# Patient Record
Sex: MALE | Race: WHITE
[De-identification: names, ages, dates, MRNs, and addresses within clinical notes are randomized per-mention and may not be internally consistent; named-entity substitution may affect disease eponyms.]

---

## 2019-11-08 ENCOUNTER — HEALTH MAINTENANCE LETTER (OUTPATIENT)
Age: 34
End: 2019-11-08

## 2020-12-06 ENCOUNTER — HEALTH MAINTENANCE LETTER (OUTPATIENT)
Age: 35
End: 2020-12-06

## 2021-09-25 ENCOUNTER — HEALTH MAINTENANCE LETTER (OUTPATIENT)
Age: 36
End: 2021-09-25

## 2022-01-15 ENCOUNTER — HEALTH MAINTENANCE LETTER (OUTPATIENT)
Age: 37
End: 2022-01-15

## 2023-01-07 ENCOUNTER — HEALTH MAINTENANCE LETTER (OUTPATIENT)
Age: 38
End: 2023-01-07

## 2024-02-10 ENCOUNTER — HEALTH MAINTENANCE LETTER (OUTPATIENT)
Age: 39
End: 2024-02-10

## 2024-12-29 ENCOUNTER — HOSPITAL ENCOUNTER (EMERGENCY)
Facility: CLINIC | Age: 39
Discharge: HOME | End: 2024-12-29
Attending: EMERGENCY MEDICINE | Admitting: EMERGENCY MEDICINE
Payer: COMMERCIAL

## 2024-12-29 VITALS
WEIGHT: 155 LBS | TEMPERATURE: 98 F | BODY MASS INDEX: 22.19 KG/M2 | HEIGHT: 70 IN | OXYGEN SATURATION: 97 % | HEART RATE: 58 BPM | SYSTOLIC BLOOD PRESSURE: 109 MMHG | RESPIRATION RATE: 18 BRPM | DIASTOLIC BLOOD PRESSURE: 74 MMHG

## 2024-12-29 DIAGNOSIS — S61.411A LACERATION OF RIGHT HAND WITHOUT FOREIGN BODY, INITIAL ENCOUNTER: ICD-10-CM

## 2024-12-29 PROCEDURE — 99282 EMERGENCY DEPT VISIT SF MDM: CPT | Mod: 25

## 2024-12-29 PROCEDURE — 12001 RPR S/N/AX/GEN/TRNK 2.5CM/<: CPT

## 2024-12-29 ASSESSMENT — COLUMBIA-SUICIDE SEVERITY RATING SCALE - C-SSRS
2. HAVE YOU ACTUALLY HAD ANY THOUGHTS OF KILLING YOURSELF IN THE PAST MONTH?: NO
1. IN THE PAST MONTH, HAVE YOU WISHED YOU WERE DEAD OR WISHED YOU COULD GO TO SLEEP AND NOT WAKE UP?: NO
6. HAVE YOU EVER DONE ANYTHING, STARTED TO DO ANYTHING, OR PREPARED TO DO ANYTHING TO END YOUR LIFE?: NO

## 2024-12-29 ASSESSMENT — ACTIVITIES OF DAILY LIVING (ADL): ADLS_ACUITY_SCORE: 41

## 2024-12-30 NOTE — ED PROVIDER NOTES
"  Emergency Department Note      History of Present Illness     Chief Complaint   Laceration      HPI   Bang Almodovar is a 39 year old male presents to us after cutting himself on a brand-new hatchet.  He had handed it to his son, and the edge of the blade caused a small laceration to the dorsal aspect of his right hand, just proximal to the second MCP joint.  Because of gaping and venous oozing, his wife recommended him to come in (pediatrician).  He works in software.  He denies weakness or numbness to the finger.  He denies any other injury.    Independent Historian   None    Review of External Notes   None    Past Medical History     Medical History and Problem List   Past Medical History:   Diagnosis Date    NO ACTIVE PROBLEMS        Medications   MULTIVITAMINS PO TABS        Surgical History   Past Surgical History:   Procedure Laterality Date    NO HISTORY OF SURGERY         Physical Exam     Patient Vitals for the past 24 hrs:   BP Temp Temp src Pulse Resp SpO2 Height Weight   12/29/24 2137 109/74 98  F (36.7  C) Oral 58 18 97 % 1.778 m (5' 10\") 70.3 kg (155 lb)     Physical Exam  MSK:  Normal movement through the elbow, wrist, and fingers without tendonous deficit.    SKIN:  Warm and dry with strong radial pulse and normal capillary refill.  There is a 1 cm laceration to the dorsal aspect of the right hand, just proximal to the second MCP joint.    NEURO:  5/5 strength through the fingers/wrist/elbow.  Normal sensation through the radial/ulnar/median nerve distributions.    PSYCH:  Normal affect      Diagnostics       ED Course      Medications Administered   Medications - No data to display    Procedures   Procedures     Laceration Repair      Procedure: Laceration Repair    Indication: Laceration    Consent: Verbal    Tetanus status reviewed    Location: Right Hand     Length: 1 cm    Preparation: Irrigation with Sterile Saline.    Anesthesia/Sedation: Lidocaine with Epinephrine - " 1%      Treatment/Exploration: Wound explored, no foreign bodies found     Closure: The wound was closed with one layer. Skin/superficial layer was closed with 2 x 4-0 Nylon using Interrupted sutures.     Patient Status: The patient tolerated the procedure well: Yes. There were no complications.        Discussion of Management   None    ED Course        Additional Documentation  None    Medical Decision Making / Diagnosis     CMS Diagnoses: None    MIPS       None    [facility]   Bang Almodovar is a 39 year old male presenting to us after suffering a laceration to the dorsum of his right hand.  This was repaired as above.  There is no evidence of tendon injury.  There is no vascular compromise.  There is no concern for foreign body and therefore an x-ray was not obtained.  Plan will be for suture removal in 10 to 14 days.  Tetanus is up-to-date.  No antibiotics are indicated.  He will return to us for any worsening of condition or other emergent concerns.    Disposition   The patient was discharged.     Diagnosis     ICD-10-CM    1. Laceration of right hand without foreign body, initial encounter  S61.411A                  Chad A. Trierweiler, MD Trierweiler, Chad A, MD  12/29/24 5663

## 2024-12-30 NOTE — ED TRIAGE NOTES
Pt reports accidentally cutting his Right hand with a hatchet. Bleeding controlled with bandaids. Unsure of tetanus.      Triage Assessment (Adult)       Row Name 12/29/24 5053          Triage Assessment    Airway WDL WDL        Respiratory WDL    Respiratory WDL WDL        Cardiac WDL    Cardiac WDL WDL        Peripheral/Neurovascular WDL    Peripheral Neurovascular WDL WDL        Cognitive/Neuro/Behavioral WDL    Cognitive/Neuro/Behavioral WDL WDL

## 2025-03-02 ENCOUNTER — HEALTH MAINTENANCE LETTER (OUTPATIENT)
Age: 40
End: 2025-03-02